# Patient Record
Sex: FEMALE | Employment: UNEMPLOYED | URBAN - METROPOLITAN AREA
[De-identification: names, ages, dates, MRNs, and addresses within clinical notes are randomized per-mention and may not be internally consistent; named-entity substitution may affect disease eponyms.]

---

## 2017-05-08 ENCOUNTER — OFFICE VISIT (OUTPATIENT)
Dept: URGENT CARE | Facility: CLINIC | Age: 62
End: 2017-05-08

## 2017-05-08 VITALS
DIASTOLIC BLOOD PRESSURE: 84 MMHG | SYSTOLIC BLOOD PRESSURE: 130 MMHG | OXYGEN SATURATION: 96 % | WEIGHT: 147 LBS | HEIGHT: 61 IN | TEMPERATURE: 98.4 F | BODY MASS INDEX: 27.75 KG/M2 | HEART RATE: 85 BPM

## 2017-05-08 DIAGNOSIS — B02.9 HERPES ZOSTER WITHOUT COMPLICATION: ICD-10-CM

## 2017-05-08 DIAGNOSIS — M79.2 NEUROPATHIC PAIN: ICD-10-CM

## 2017-05-08 PROCEDURE — 99203 OFFICE O/P NEW LOW 30 MIN: CPT | Performed by: PHYSICIAN ASSISTANT

## 2017-05-08 RX ORDER — TRAMADOL HYDROCHLORIDE 50 MG/1
50 TABLET ORAL EVERY 4 HOURS PRN
Qty: 30 TAB | Refills: 0 | Status: SHIPPED | OUTPATIENT
Start: 2017-05-08

## 2017-05-08 RX ORDER — VALACYCLOVIR HYDROCHLORIDE 500 MG/1
1000 TABLET, FILM COATED ORAL 3 TIMES DAILY
Qty: 42 TAB | Refills: 0 | Status: SHIPPED | OUTPATIENT
Start: 2017-05-08 | End: 2017-05-15

## 2017-05-08 RX ORDER — VALACYCLOVIR HYDROCHLORIDE 1 G/1
1000 TABLET, FILM COATED ORAL 2 TIMES DAILY
Qty: 20 TAB | Refills: 0 | Status: CANCELLED | OUTPATIENT
Start: 2017-05-08

## 2017-05-08 ASSESSMENT — ENCOUNTER SYMPTOMS
NAIL CHANGES: 0
SHORTNESS OF BREATH: 0
DIZZINESS: 0
HEADACHES: 0
NAUSEA: 0
VOMITING: 0
ABDOMINAL PAIN: 0
CHILLS: 0
MUSCULOSKELETAL NEGATIVE: 1
DIARRHEA: 0
FEVER: 0

## 2017-05-09 NOTE — PROGRESS NOTES
"Subjective:      Anne Brown is a 62 y.o. female who presents with Back Pain    Patient is accompanied by her daughter.         Rash  This is a new problem. The current episode started 1 to 4 weeks ago (1 week). Location: right chest, right upper back. The rash is characterized by blistering, burning and redness. Pertinent negatives include no diarrhea, fever, joint pain, nail changes, shortness of breath or vomiting. Past treatments include nothing. There is no history of allergies, asthma or eczema.     Patient presents to urgent care reporting a painful, \"burning\" rash on her cheat and back x 1 week ago. She denies any history of shingles. She states she is unsure whether or not she has ever had the chicken pox. She denies any known medical problems or current regular medications. She is currently without health insurance, but [place] work and screenings and denies any medical problems.     Review of Systems   Constitutional: Negative for fever and chills.   Respiratory: Negative for shortness of breath.    Cardiovascular: Negative for chest pain.   Gastrointestinal: Negative for nausea, vomiting, abdominal pain and diarrhea.   Genitourinary: Negative.    Musculoskeletal: Negative.  Negative for joint pain.   Skin: Positive for rash. Negative for nail changes.   Neurological: Negative for dizziness and headaches.          Objective:     /84 mmHg  Pulse 85  Temp(Src) 36.9 °C (98.4 °F)  Ht 1.549 m (5' 1\")  Wt 66.679 kg (147 lb)  BMI 27.79 kg/m2  SpO2 96%     Physical Exam   Constitutional: She is oriented to person, place, and time. She appears well-developed and well-nourished. No distress.   HENT:   Head: Normocephalic and atraumatic.   Eyes: Pupils are equal, round, and reactive to light.   Neck: Normal range of motion.   Cardiovascular: Normal rate.    Pulmonary/Chest: Effort normal.   Musculoskeletal: Normal range of motion.   Neurological: She is alert " and oriented to person, place, and time.   Skin: Skin is warm and dry. Rash noted. Rash is vesicular. She is not diaphoretic.        Multiple vesicular lesions present with underlying erythema on anterior upper chest, just inferior to right breast, extending laterally around to her right upper back.    Psychiatric: She has a normal mood and affect. Her behavior is normal.   Nursing note and vitals reviewed.           Medications, Allergies, and current problem list reviewed today in Epic     Assessment/Plan:     1. Herpes zoster without complication  - valacyclovir (VALTREX) 500 MG Tab; Take 2 Tabs by mouth 3 times a day for 7 days.  Dispense: 42 Tab; Refill: 0     Multiple vesicles appear to be newly formed, will treat with Valtrex x days to shorten rash duration. Educated patient on shingles, handout given at today's visit about the disease and post-herpetic neuropathy. Encouraged to be seen by a medical provider at first sign of any future rashes that appear to be similar, especially within 72 hours for optimal treatment with antivirals. The patient and her daughter both demonstrated a good understanding.       2. Neuropathic pain  - tramadol (ULTRAM) 50 MG Tab; Take 1 Tab by mouth every four hours as needed.  Dispense: 30 Tab; Refill: 0   - Will cause sedation, avoid driving, operating heavy machinery, and drinking alcohol    NarxCheck Query: I have obtained and reviewed patient utilization report from [facility] pharmacy database prior to writing prescription for controlled substance II, III or IV per [place] bill . Based on the report and my clinical assessment the prescription is medically necessary.

## 2017-05-09 NOTE — PATIENT INSTRUCTIONS
Culebrilla  (Shingles)  La culebrilla, también conocida briseyda herpes zóster, es luis infección que causa luis erupción dolorosa en la piel y ampollas llenas de líquido. La culebrilla no está relacionada con el herpes genital, que es luis enfermedad de transmisión sexual.     Solo se manifiesta en personas que:  · Tuvieron varicela.  · Recibieron la vacuna contra la varicela. ([place] es poco frecuente).  CAUSAS  La causa de la culebrilla es el virus de la varicela zóster (VVZ), el mismo virus que causa la varicela. Después de la exposición al virus de la varicela zóster, boris permanece en el organismo en un estado inactivo (latente). La culebrilla aparece si el virus se reactiva. [place] puede ocurrir muchos años después de la exposición inicial al virus. No se conocen las causas por las que boris virus se reactiva.  FACTORES DE RIESGO  Las personas que tuvieron varicela o recibieron la vacuna contra la varicela están en riesgo de tener culebrilla. La infección es más frecuente en las personas que:  · Tienen más de 50 años.  · Tienen el sistema de defensa del organismo (sistema inmunitario) debilitado, briseyda en los enfermos con VIH, sida o cáncer.  · [place] medicamentos que debilitan el sistema inmunitario, briseyda los medicamentos para trasplantes.  · Están sometidas a un gran estrés.  SÍNTOMAS  Los primeros síntomas de esta afección pueden incluir picazón, hormigueo o dolor en luis ping de la piel. Boris dolor se puede describir briseyda ardor, punzante o pulsátil.  Unos días o semanas después de que comienzan los síntomas, aparece luis erupción rojiza y dolorosa en un lado del cuerpo en un patrón con forma de portia o de stinson. Finalmente, la erupción se convierte en ampollas llenas de líquido que se abren, fiorella costras y se secan en dos o sheryl semanas.  En cualquier momento ivan la infección, puede presentar lo siguiente:  · Fiebre.  · Escalofríos.  · Dolor de trinidad.  · Malestar estomacal.  DIAGNÓSTICO  Esta afección se  diagnostica con un examen de la piel. En algunos casos, se extraen muestras de piel o del líquido de las ampollas antes de definir el diagnóstico. Estas muestras se examinan con el microscopio o se envían al laboratorio para medina análisis.  TRATAMIENTO  No hay luis micky específica para esta afección. El médico probablemente le recete medicamentos para ayudarlo a controlar el dolor, a recuperarse más rápido y a evitar problemas a zach plazo. Entre los medicamentos se incluyen los siguientes:  · Medicamentos antivirales.  · Antiinflamatorios.  · Analgésicos.  Si la ping afectada está en el clark, podrán derivarlo a un especialista, briseyda un médico especialista en ojos (oftalmólogo) o en oídos, nariz y garganta (otorrinolaringólogo) para evitar problemas oculares, dolor crónico o discapacidad.  INSTRUCCIONES PARA EL CUIDADO EN EL HOGAR  Medicamentos  · [place] los medicamentos solamente briseyda se lo haya indicado el médico.  · Aplique luis crema anestésica o luis para calmar la picazón en la ping afectada según las indicaciones del médico.  Cuidado de la erupción y las ampollas  · [place] un baño de agua fría o aplique compresas frías en la ping de la erupción o las ampollas briseyda se lo haya indicado el médico. [place] aliviará el dolor y la picazón.  · Mantenga la ping de la erupción cubierta con luis venda (vendaje). Use ropa holgada para ayudar a aliviar el dolor del roce con la erupción.  · Mantenga la erupción y las ampollas limpias con jabón suave y agua fresca o briseyda se lo indique el médico.  · Controle la erupción todos los días para detectar signos de infección. Estos signos incluyen enrojecimiento, hinchazón y dolor que perdura o aumenta.  · No pellizque las ampollas.  · No se rasque la ping de la erupción.  Instrucciones generales  · Albaro reposo según las indicaciones del médico.  · Concurra a todas las visitas de control briseyda se lo haya indicado el médico. [place] es importante.  · Hasta tanto las ampollas formen costras, la  infección puede causar varicela en las personas que nunca la tuvieron o no se vacunaron contra la varicela. Para impedir que esto suceda, evite el contacto con otras personas, en especial:  ¨ [place].  ¨ Embarazadas.  ¨ Niños que tienen eccema.  ¨ Personas mayores que valdez recibido un trasplante.  ¨ Personas con enfermedades crónicas, briseyda leucemia y sida.  SOLICITE ATENCIÓN MÉDICA SI:  · El dolor no se emely con los medicamentos prescritos.  · El dolor no mejora después de que la erupción desaparece.  · La erupción parece infectada. Los signos de infección incluyen enrojecimiento, hinchazón y dolor que perdura o aumenta.  SOLICITE ATENCIÓN MÉDICA DE INMEDIATO SI:  · La erupción aparece en el clark o la nariz.  · Tiene dolor en el clark, en la ping de los ojos o tiene pérdida de la sensibilidad en un lado del clark.  · Siente dolor o un zumbido en el oído.  · Tiene pérdida del gusto.  · La afección empeora.     Esta información no tiene briseyda fin reemplazar el consejo del médico. Asegúrese de hacerle al médico cualquier pregunta que tenga.     Document Released: 09/27/2006 Document Revised: 01/08/2016  [employer] Interactive Patient Education ©2016 [employer]    Neuralgia postherpética  (Postherpetic Neuralgia)  La neuralgia postherpética (NPH) es un dolor neural que aparece después de tener culebrilla. La culebrilla es luis erupción cutánea dolorosa que aparece en un lado del cuerpo, generalmente en el tronco o el clark. Es causada por el virus de la varicela zoster, el mismo virus que causa la varicela. En las personas que tuvieron varicela, el virus puede reaparecer años más tarde y causar culebrilla.  Puede tener neuralgia postherpética si sigue teniendo dolor ivan 3 meses después de la desaparición de la erupción de la culebrilla. La neuralgia postherpética aparece en la misma ping donde se produjo la erupción cutánea de la culebrilla, y, en la mayoría de los casos, desaparece en el término de 1 año.   La  aplicación de la vacuna contra la culebrilla puede prevenir la neuralgia postherpética. Se recomienda que las personas mayores de 50 años se apliquen esta vacuna, la cual puede prevenir la culebrilla y, además, reducir el riesgo de neuralgia postherpética si tiene culebrilla.  CAUSAS  La neuralgia postherpética se debe al daño neural que causa el virus de la varicela zoster. Mayda daño hipersensibiliza los nervios.   FACTORES DE RIESGO  El envejecimiento es el principal factor de riesgo de desarrollar neuralgia postherpética. La mayoría de las personas que padecen mayda trastorno son mayores de 60 años. Otros factores de riesgo son:  · Sentir un dolor muy intenso antes de que aparezca la erupción cutánea de la culebrilla.  · Tener luis erupción cutánea muy extensa.  · Tener culebrilla en el nervio que inerva el clark y el sergio (nervio trigémino).  SIGNOS Y SÍNTOMAS  El dolor es el síntoma principal de neuralgia postherpética. Suele ser muy intenso y puede describirse briseyda un dolor punzante, que provoca sensación de quemazón o que se parece a luis descarga eléctrica. El dolor puede ser intermitente o permanente. Los roces suaves sobre la piel o los cambios de temperatura pueden desencadenar el dolor. [place] con el dolor, puede tener picazón.  DIAGNÓSTICO   El médico puede diagnosticar la neuralgia postherpética en función de los síntomas y de los antecedentes de culebrilla. Generalmente, no es necesario realizar análisis de laboratorio ni otras pruebas diagnósticas.  TRATAMIENTO   No hay micky para la neuralgia postherpética. El tratamiento se centrará en el alivio del dolor. Generalmente, los analgésicos de venta estelle no alivian el dolor que provoca la neuralgia postherpética. Willy vez deba consultar a un especialista en dolor. El tratamiento puede incluir:  · Antidepresivos para ayudar con el dolor y mejorar el sueño.  · Anticonvulsivos para aliviar el dolor neural.  · Analgésicos yen (opioides).  · Un parche  anestésico que se coloca sobre la piel (parche de lidocaína).  INSTRUCCIONES PARA EL CUIDADO EN EL HOGAR  Recuperarse de la neuralgia postherpética puede llevar mucho tiempo. Trabaje en estrecha colaboración con el médico y procure tener un buen sistema de apoyo en medina casa.   · [place] todos los medicamentos briseyda se lo haya indicado el médico.  · Use ropa cómoda y suelta.  · Cubra las zonas sensibles con luis venda para reducir la fricción de las prendas contra la ping.  · Si el frío no empeora el dolor, intente aplicarse luis compresa fría o luis bolsa con gel para bajar la temperatura en la ping.  · Hable con el médico si está deprimido o desesperado. Convivir con un dolor crónico puede causar depresión.  SOLICITE ATENCIÓN MÉDICA SI:  · El medicamento no le calma el dolor.  · Tiene dificultades para controlar el dolor en medina casa.     Esta información no tiene briseyda fin reemplazar el consejo del médico. Asegúrese de hacerle al médico cualquier pregunta que tenga.     Document Released: 04/05/2010 Document Revised: 01/08/2016  [employer] Interactive Patient Education ©2016 [employer]